# Patient Record
Sex: MALE | Race: WHITE | NOT HISPANIC OR LATINO | Employment: OTHER | URBAN - NONMETROPOLITAN AREA
[De-identification: names, ages, dates, MRNs, and addresses within clinical notes are randomized per-mention and may not be internally consistent; named-entity substitution may affect disease eponyms.]

---

## 2023-02-09 ENCOUNTER — TELEPHONE (OUTPATIENT)
Dept: SURGERY | Facility: CLINIC | Age: 63
End: 2023-02-09
Payer: MEDICARE

## 2023-02-22 NOTE — TELEPHONE ENCOUNTER
CAC GOT RETURNED DUE TO BAD ADDRESS. CALLED AND LVM WITH PT TO VERIFY ADDRESS SO I CAN SEND A NEW ONE.

## 2023-03-22 ENCOUNTER — PREP FOR SURGERY (OUTPATIENT)
Dept: OTHER | Facility: HOSPITAL | Age: 63
End: 2023-03-22
Payer: MEDICARE

## 2023-03-22 DIAGNOSIS — Z12.11 COLON CANCER SCREENING: Primary | ICD-10-CM

## 2023-04-10 ENCOUNTER — ANESTHESIA EVENT (OUTPATIENT)
Dept: PERIOP | Facility: HOSPITAL | Age: 63
End: 2023-04-10
Payer: MEDICARE

## 2023-04-10 RX ORDER — LOSARTAN POTASSIUM AND HYDROCHLOROTHIAZIDE 12.5; 5 MG/1; MG/1
1 TABLET ORAL DAILY
COMMUNITY

## 2023-04-11 ENCOUNTER — HOSPITAL ENCOUNTER (OUTPATIENT)
Facility: HOSPITAL | Age: 63
Setting detail: HOSPITAL OUTPATIENT SURGERY
Discharge: HOME | End: 2023-04-11
Attending: SURGERY | Admitting: SURGERY
Payer: MEDICARE

## 2023-04-11 ENCOUNTER — ANESTHESIA (OUTPATIENT)
Dept: PERIOP | Facility: HOSPITAL | Age: 63
End: 2023-04-11
Payer: MEDICARE

## 2023-04-11 VITALS
BODY MASS INDEX: 31.21 KG/M2 | OXYGEN SATURATION: 95 % | RESPIRATION RATE: 15 BRPM | DIASTOLIC BLOOD PRESSURE: 80 MMHG | WEIGHT: 218 LBS | HEIGHT: 70 IN | SYSTOLIC BLOOD PRESSURE: 158 MMHG | TEMPERATURE: 97.4 F | HEART RATE: 60 BPM

## 2023-04-11 DIAGNOSIS — Z12.11 COLON CANCER SCREENING: ICD-10-CM

## 2023-04-11 PROCEDURE — 45380 COLONOSCOPY AND BIOPSY: CPT | Performed by: SURGERY

## 2023-04-11 PROCEDURE — 88305 TISSUE EXAM BY PATHOLOGIST: CPT | Performed by: SURGERY

## 2023-04-11 PROCEDURE — 25010000002 PROPOFOL 200 MG/20ML EMULSION: Performed by: REGISTERED NURSE

## 2023-04-11 PROCEDURE — 45385 COLONOSCOPY W/LESION REMOVAL: CPT | Performed by: SURGERY

## 2023-04-11 RX ORDER — SODIUM CHLORIDE 0.9 % (FLUSH) 0.9 %
10 SYRINGE (ML) INJECTION EVERY 12 HOURS SCHEDULED
Status: DISCONTINUED | OUTPATIENT
Start: 2023-04-11 | End: 2023-04-11 | Stop reason: HOSPADM

## 2023-04-11 RX ORDER — PROPOFOL 10 MG/ML
INJECTION, EMULSION INTRAVENOUS AS NEEDED
Status: DISCONTINUED | OUTPATIENT
Start: 2023-04-11 | End: 2023-04-11 | Stop reason: SURG

## 2023-04-11 RX ORDER — SODIUM CHLORIDE, SODIUM LACTATE, POTASSIUM CHLORIDE, CALCIUM CHLORIDE 600; 310; 30; 20 MG/100ML; MG/100ML; MG/100ML; MG/100ML
100 INJECTION, SOLUTION INTRAVENOUS CONTINUOUS
Status: DISCONTINUED | OUTPATIENT
Start: 2023-04-11 | End: 2023-04-11 | Stop reason: HOSPADM

## 2023-04-11 RX ORDER — GLYCOPYRROLATE 0.2 MG/ML
INJECTION INTRAMUSCULAR; INTRAVENOUS AS NEEDED
Status: DISCONTINUED | OUTPATIENT
Start: 2023-04-11 | End: 2023-04-11 | Stop reason: SURG

## 2023-04-11 RX ORDER — LIDOCAINE HYDROCHLORIDE 10 MG/ML
INJECTION, SOLUTION EPIDURAL; INFILTRATION; INTRACAUDAL; PERINEURAL AS NEEDED
Status: DISCONTINUED | OUTPATIENT
Start: 2023-04-11 | End: 2023-04-11 | Stop reason: SURG

## 2023-04-11 RX ORDER — SODIUM CHLORIDE, SODIUM LACTATE, POTASSIUM CHLORIDE, CALCIUM CHLORIDE 600; 310; 30; 20 MG/100ML; MG/100ML; MG/100ML; MG/100ML
9 INJECTION, SOLUTION INTRAVENOUS CONTINUOUS
Status: DISCONTINUED | OUTPATIENT
Start: 2023-04-11 | End: 2023-04-11 | Stop reason: HOSPADM

## 2023-04-11 RX ORDER — SODIUM CHLORIDE 0.9 % (FLUSH) 0.9 %
10 SYRINGE (ML) INJECTION AS NEEDED
Status: DISCONTINUED | OUTPATIENT
Start: 2023-04-11 | End: 2023-04-11 | Stop reason: HOSPADM

## 2023-04-11 RX ORDER — LIDOCAINE HYDROCHLORIDE 10 MG/ML
0.5 INJECTION, SOLUTION EPIDURAL; INFILTRATION; INTRACAUDAL; PERINEURAL ONCE AS NEEDED
Status: DISCONTINUED | OUTPATIENT
Start: 2023-04-11 | End: 2023-04-11 | Stop reason: HOSPADM

## 2023-04-11 RX ORDER — ONDANSETRON 2 MG/ML
4 INJECTION INTRAMUSCULAR; INTRAVENOUS ONCE AS NEEDED
Status: DISCONTINUED | OUTPATIENT
Start: 2023-04-11 | End: 2023-04-11 | Stop reason: HOSPADM

## 2023-04-11 RX ORDER — SODIUM CHLORIDE 9 MG/ML
40 INJECTION, SOLUTION INTRAVENOUS AS NEEDED
Status: DISCONTINUED | OUTPATIENT
Start: 2023-04-11 | End: 2023-04-11 | Stop reason: HOSPADM

## 2023-04-11 RX ADMIN — GLYCOPYRROLATE 0.2 MG: 0.2 INJECTION INTRAMUSCULAR; INTRAVENOUS at 08:04

## 2023-04-11 RX ADMIN — PROPOFOL INJECTABLE EMULSION 75 MCG/KG/MIN: 10 INJECTION, EMULSION INTRAVENOUS at 08:02

## 2023-04-11 RX ADMIN — SODIUM CHLORIDE, POTASSIUM CHLORIDE, SODIUM LACTATE AND CALCIUM CHLORIDE 9 ML/HR: 600; 310; 30; 20 INJECTION, SOLUTION INTRAVENOUS at 07:21

## 2023-04-11 RX ADMIN — PROPOFOL INJECTABLE EMULSION 100 MG: 10 INJECTION, EMULSION INTRAVENOUS at 07:59

## 2023-04-11 RX ADMIN — PROPOFOL INJECTABLE EMULSION 50 MG: 10 INJECTION, EMULSION INTRAVENOUS at 08:01

## 2023-04-11 RX ADMIN — LIDOCAINE HYDROCHLORIDE 50 MG: 10 INJECTION, SOLUTION EPIDURAL; INFILTRATION; INTRACAUDAL; PERINEURAL at 07:59

## 2023-04-11 NOTE — ANESTHESIA PREPROCEDURE EVALUATION
Anesthesia Evaluation     Patient summary reviewed and Nursing notes reviewed   no history of anesthetic complications:  NPO Solid Status: > 8 hours  NPO Liquid Status: > 8 hours           Airway   Mallampati: II  TM distance: >3 FB  No difficulty expected  Dental    (+) lower dentures and upper dentures    Pulmonary - normal exam   (+) a smoker (1 ppd smoker x 45 years) Current Smoked day of surgery,   Cardiovascular - normal exam    (+) hypertension well controlled,       Neuro/Psych- negative ROS  GI/Hepatic/Renal/Endo - negative ROS     Musculoskeletal     Abdominal  - normal exam   Substance History   (+) alcohol use (beers on the weekends),      OB/GYN negative ob/gyn ROS         Other   arthritis,                    Anesthesia Plan    ASA 2     MAC     intravenous induction     Anesthetic plan, risks, benefits, and alternatives have been provided, discussed and informed consent has been obtained with: patient.  Pre-procedure education provided  Use of blood products discussed with patient  Consented to blood products.   Plan discussed with CRNA.        CODE STATUS:

## 2023-04-11 NOTE — ANESTHESIA POSTPROCEDURE EVALUATION
Patient: Wesly Huizar    Procedure Summary     Date: 04/11/23 Room / Location: [facility] ENDOSCOPY 1 /  [facility]    Anesthesia Start: 0758 Anesthesia Stop: 0829    Procedure: colonoscopy with possible biopsy or polypectomy Diagnosis:       Colon cancer screening      (Colon cancer screening [Z12.11])    Surgeons: Mary Jurado DO Provider: Wesly Samuels CRNA    Anesthesia Type: MAC ASA Status: 2          Anesthesia Type: MAC    Vitals  Vitals Value Taken Time   /73 04/11/23 0840   Temp     Pulse 65 04/11/23 0840   Resp 12 04/11/23 0840   SpO2 96 % 04/11/23 0840           Post Anesthesia Care and Evaluation    Patient location during evaluation: bedside  Patient participation: complete - patient participated  Level of consciousness: awake and alert  Pain score: 0  Pain management: adequate    Airway patency: patent  Anesthetic complications: No anesthetic complications  PONV Status: none  Cardiovascular status: acceptable  Respiratory status: acceptable  Hydration status: acceptable  No anesthesia care post op

## 2023-04-11 NOTE — H&P
"General Surgery      Patient Care Team:  Charan Orellana MD as PCP - General (Family Medicine)  Lam Garcia MD as PCP - Family Medicine    CHIEF COMPLAINT: History of colon polyps, family history of colon cancer    HISTORY OF PRESENT ILLNESS:    This very pleasant 8-year-old male is here for colonoscopy.  His last colonoscopy was 7 or 8 years ago.  He did have polyps removed on his last colonoscopy.  His brother  of colon cancer.  He has no complaints.      Past Medical History:   Diagnosis Date   • Arthritis    • Hypertension    • Polyp of colon      Past Surgical History:   Procedure Laterality Date   • COLONOSCOPY     • SHOULDER ARTHROSCOPY Right    • SHOULDER ARTHROSCOPY W/ ROTATOR CUFF REPAIR Left     X2     History reviewed. No pertinent family history.  Social History     Tobacco Use   • Smoking status: Every Day     Packs/day: 1.00     Years: 45.00     Pack years: 45.00     Types: Cigarettes   Vaping Use   • Vaping Use: Never used   Substance Use Topics   • Alcohol use: Yes     Comment: weekends   • Drug use: Never     Medications Prior to Admission   Medication Sig Dispense Refill Last Dose   • losartan-hydrochlorothiazide (HYZAAR) 50-12.5 MG per tablet Take 1 tablet by mouth Daily.      • meloxicam (MOBIC) 7.5 MG tablet Take 1 tablet by mouth Daily.        Allergies:  Patient has no active allergies.    REVIEW OF SYSTEMS:  Please see the above history of present illness for pertinent positives and negatives.  The remainder of the patient's systems have been reviewed and are negative.     Vital Signs       Flowsheet Rows    Flowsheet Row First Filed Value   Admission Height 177.8 cm (70\") Documented at 04/10/2023 1015   Admission Weight 99.8 kg (220 lb) Documented at 04/10/2023 1015           Physical Exam:  Physical Exam   Constitutional: Patient appears well-developed and well-nourished and in no acute distress   HEENT:   Head: Normocephalic and atraumatic.   Eyes:  Pupils are equal, round, and " reactive to light.  Mouth and Throat: Patient has moist mucous membranes. Oropharynx is clear of any erythema or exudate.     Musculoskeletal: Normal posture.  Extremities: No edema. Pulses are palpable in all 4 extremities.  Neurological: Patient is alert and oriented.  Psychological:   Mood and behavior appropriate.  Skin: Skin is warm and dry.    Debilities/Disabilities Identified: None    Emotional Behavior: Appropriate           Results Review:    I reviewed the patient's new clinical results.  Lab Results (most recent)     None          Imaging Results (Most Recent)     None        not reviewed    ECG/EMG Results (most recent)     None        not reviewed    Assessment & Plan     Personal history of colon polyp  Family history of colon cancer  I discussed with the patient the benefits and risks of performing endoscopy.  Benefits and risks were not limited to but including bleeding, infection, perforation, complications of anesthesia, aspiration.  The patient appeared to understand and is willing to proceed.    Thank you for allowing me to participate in the care of this interesting patient.      Mary Jurado DO  04/11/23  07:11 EDT

## 2023-04-11 NOTE — OP NOTE
Colonoscopy Procedure Note      Pre-operative Diagnosis: History of colon polyps    Post-operative Diagnosis: Polyps of the right colon, transverse colon, and rectum    Procedure: Colonoscopy with polypectomy using hot snare and cold forceps    Surgeon: Adrien    Anesthetic: Wesly Cm CRNA    Estimated Blood Loss: Minimal    Complications: None    Indications: History of colon polyps    Findings/Treatments:   Right colon polyp x1-removed with hot snare  Transverse colon polyps x5-removed with cold forceps  Rectal polyps x5-removed with cold forceps       Scope Withdrawal Time:  > 6 minutes      Recommendations: Await pathology      Procedure Details     After discussing the benefits and risks of the procedure with the patient, not limited to but including:  Bleeding, infection, perforation, aspiration; informed consent was signed.  The patient was taken into the endoscopy room at [facility] and placed in the left lateral decubitus position.  MAC anesthesia was given with appropriate cardiopulmonary monitoring.  A rectal exam was performed.  Sphincter tone was normal.  The colonoscope was then inserted and carefully advanced to the cecum while visualizing the mucosa.  The cecum was identified by the ileocecal valve and the orifice of the appendix.  Once in the cecum the scope was withdrawn into the right colon where a large polyp was removed hot snare.  The scope was then slowly withdrawn while carefully evaluating the mucosa and deflating air.  There were 5 transverse colon polyps removed with cold forceps as well as 5 rectal polyps removed with cold forceps.  After the rectal polyps were removed the scope was retroflexed and straightened and withdrawn.  Wesly was then taken to the recovery area in stable postoperative condition having tolerated his procedure well.    Mary Jurado DO

## 2023-04-12 LAB
LAB AP CASE REPORT: NORMAL
PATH REPORT.FINAL DX SPEC: NORMAL
PATH REPORT.GROSS SPEC: NORMAL

## 2024-06-07 ENCOUNTER — TRANSCRIBE ORDERS (OUTPATIENT)
Dept: ADMINISTRATIVE | Facility: HOSPITAL | Age: 64
End: 2024-06-07
Payer: MEDICARE

## 2024-06-07 ENCOUNTER — HOSPITAL ENCOUNTER (OUTPATIENT)
Dept: GENERAL RADIOLOGY | Facility: HOSPITAL | Age: 64
Discharge: HOME | End: 2024-06-07
Payer: MEDICARE

## 2024-06-07 DIAGNOSIS — M25.511 RIGHT SHOULDER PAIN, UNSPECIFIED CHRONICITY: Primary | ICD-10-CM

## 2024-06-07 DIAGNOSIS — M25.511 RIGHT SHOULDER PAIN, UNSPECIFIED CHRONICITY: ICD-10-CM

## 2024-06-07 PROCEDURE — 72110 X-RAY EXAM L-2 SPINE 4/>VWS: CPT

## 2024-07-02 ENCOUNTER — TRANSCRIBE ORDERS (OUTPATIENT)
Dept: ADMINISTRATIVE | Facility: HOSPITAL | Age: 64
End: 2024-07-02
Payer: MEDICARE

## 2024-07-02 ENCOUNTER — HOSPITAL ENCOUNTER (OUTPATIENT)
Dept: GENERAL RADIOLOGY | Facility: HOSPITAL | Age: 64
Discharge: HOME | End: 2024-07-02
Admitting: ANESTHESIOLOGY
Payer: MEDICARE

## 2024-07-02 DIAGNOSIS — M25.511 RIGHT SHOULDER PAIN, UNSPECIFIED CHRONICITY: Primary | ICD-10-CM

## 2024-07-02 DIAGNOSIS — M25.511 RIGHT SHOULDER PAIN, UNSPECIFIED CHRONICITY: ICD-10-CM

## 2024-07-02 PROCEDURE — 73502 X-RAY EXAM HIP UNI 2-3 VIEWS: CPT

## 2024-07-11 ENCOUNTER — TRANSCRIBE ORDERS (OUTPATIENT)
Dept: MRI IMAGING | Facility: HOSPITAL | Age: 64
End: 2024-07-11
Payer: MEDICARE

## 2024-07-17 ENCOUNTER — TRANSCRIBE ORDERS (OUTPATIENT)
Dept: MRI IMAGING | Facility: HOSPITAL | Age: 64
End: 2024-07-17
Payer: MEDICARE

## 2024-07-17 DIAGNOSIS — M54.50 LOW BACK PAIN, UNSPECIFIED BACK PAIN LATERALITY, UNSPECIFIED CHRONICITY, UNSPECIFIED WHETHER SCIATICA PRESENT: Primary | ICD-10-CM

## 2024-07-29 ENCOUNTER — HOSPITAL ENCOUNTER (OUTPATIENT)
Dept: MRI IMAGING | Facility: HOSPITAL | Age: 64
Discharge: HOME | End: 2024-07-29
Admitting: ANESTHESIOLOGY
Payer: MEDICARE

## 2024-07-29 DIAGNOSIS — M54.50 LOW BACK PAIN, UNSPECIFIED BACK PAIN LATERALITY, UNSPECIFIED CHRONICITY, UNSPECIFIED WHETHER SCIATICA PRESENT: ICD-10-CM

## 2024-07-29 PROCEDURE — 72148 MRI LUMBAR SPINE W/O DYE: CPT

## 2024-10-08 ENCOUNTER — OFFICE VISIT (OUTPATIENT)
Dept: INTERNAL MEDICINE | Facility: CLINIC | Age: 64
End: 2024-10-08
Payer: MEDICARE

## 2024-10-08 VITALS
DIASTOLIC BLOOD PRESSURE: 82 MMHG | HEIGHT: 70 IN | HEART RATE: 82 BPM | BODY MASS INDEX: 29.2 KG/M2 | OXYGEN SATURATION: 96 % | WEIGHT: 204 LBS | TEMPERATURE: 98.4 F | SYSTOLIC BLOOD PRESSURE: 132 MMHG

## 2024-10-08 DIAGNOSIS — Z12.5 SCREENING FOR PROSTATE CANCER: ICD-10-CM

## 2024-10-08 DIAGNOSIS — M79.605 LEFT LEG PAIN: ICD-10-CM

## 2024-10-08 DIAGNOSIS — M25.511 CHRONIC RIGHT SHOULDER PAIN: Primary | ICD-10-CM

## 2024-10-08 DIAGNOSIS — R73.9 HYPERGLYCEMIA: ICD-10-CM

## 2024-10-08 DIAGNOSIS — G89.29 CHRONIC RIGHT SHOULDER PAIN: Primary | ICD-10-CM

## 2024-10-08 DIAGNOSIS — Z80.0 FAMILY HISTORY OF STOMACH CANCER: ICD-10-CM

## 2024-10-08 DIAGNOSIS — R73.09 OTHER ABNORMAL GLUCOSE: ICD-10-CM

## 2024-10-08 DIAGNOSIS — L57.0 ACTINIC KERATOSIS: ICD-10-CM

## 2024-10-08 DIAGNOSIS — I10 PRIMARY HYPERTENSION: ICD-10-CM

## 2024-10-08 DIAGNOSIS — R79.9 ABNORMAL FINDING OF BLOOD CHEMISTRY, UNSPECIFIED: ICD-10-CM

## 2024-10-08 DIAGNOSIS — Z13.220 SCREENING FOR HYPERLIPIDEMIA: ICD-10-CM

## 2024-10-08 LAB
ALBUMIN SERPL-MCNC: 4.3 G/DL (ref 3.5–5.2)
ALBUMIN/GLOB SERPL: 1.9 G/DL
ALP SERPL-CCNC: 88 U/L (ref 39–117)
ALT SERPL-CCNC: 18 U/L (ref 1–41)
AST SERPL-CCNC: 16 U/L (ref 1–40)
BASOPHILS # BLD AUTO: 0.08 10*3/MM3 (ref 0–0.2)
BASOPHILS NFR BLD AUTO: 0.8 % (ref 0–1.5)
BILIRUB SERPL-MCNC: 0.5 MG/DL (ref 0–1.2)
BUN SERPL-MCNC: 12 MG/DL (ref 8–23)
BUN/CREAT SERPL: 14.3 (ref 7–25)
CALCIUM SERPL-MCNC: 9.8 MG/DL (ref 8.6–10.5)
CHLORIDE SERPL-SCNC: 102 MMOL/L (ref 98–107)
CHOLEST SERPL-MCNC: 208 MG/DL (ref 0–200)
CO2 SERPL-SCNC: 23.4 MMOL/L (ref 22–29)
CREAT SERPL-MCNC: 0.84 MG/DL (ref 0.76–1.27)
EGFRCR SERPLBLD CKD-EPI 2021: 99.2 ML/MIN/1.73
EOSINOPHIL # BLD AUTO: 0.14 10*3/MM3 (ref 0–0.4)
EOSINOPHIL NFR BLD AUTO: 1.4 % (ref 0.3–6.2)
ERYTHROCYTE [DISTWIDTH] IN BLOOD BY AUTOMATED COUNT: 13.1 % (ref 12.3–15.4)
GLOBULIN SER CALC-MCNC: 2.3 GM/DL
GLUCOSE SERPL-MCNC: 93 MG/DL (ref 65–99)
HBA1C MFR BLD: 6.4 % (ref 4.8–5.6)
HCT VFR BLD AUTO: 51.3 % (ref 37.5–51)
HDLC SERPL-MCNC: 52 MG/DL (ref 40–60)
HGB BLD-MCNC: 16.4 G/DL (ref 13–17.7)
IMM GRANULOCYTES # BLD AUTO: 0.06 10*3/MM3 (ref 0–0.05)
IMM GRANULOCYTES NFR BLD AUTO: 0.6 % (ref 0–0.5)
LDLC SERPL CALC-MCNC: 131 MG/DL (ref 0–100)
LDLC/HDLC SERPL: 2.47 {RATIO}
LYMPHOCYTES # BLD AUTO: 2.82 10*3/MM3 (ref 0.7–3.1)
LYMPHOCYTES NFR BLD AUTO: 28.9 % (ref 19.6–45.3)
MCH RBC QN AUTO: 28.4 PG (ref 26.6–33)
MCHC RBC AUTO-ENTMCNC: 32 G/DL (ref 31.5–35.7)
MCV RBC AUTO: 88.9 FL (ref 79–97)
MONOCYTES # BLD AUTO: 0.96 10*3/MM3 (ref 0.1–0.9)
MONOCYTES NFR BLD AUTO: 9.8 % (ref 5–12)
NEUTROPHILS # BLD AUTO: 5.7 10*3/MM3 (ref 1.7–7)
NEUTROPHILS NFR BLD AUTO: 58.5 % (ref 42.7–76)
NRBC BLD AUTO-RTO: 0 /100 WBC (ref 0–0.2)
PLATELET # BLD AUTO: 324 10*3/MM3 (ref 140–450)
POTASSIUM SERPL-SCNC: 4.7 MMOL/L (ref 3.5–5.2)
PROT SERPL-MCNC: 6.6 G/DL (ref 6–8.5)
PSA SERPL-MCNC: 0.56 NG/ML (ref 0–4)
RBC # BLD AUTO: 5.77 10*6/MM3 (ref 4.14–5.8)
SODIUM SERPL-SCNC: 139 MMOL/L (ref 136–145)
T4 FREE SERPL-MCNC: 1.35 NG/DL (ref 0.92–1.68)
TRIGL SERPL-MCNC: 138 MG/DL (ref 0–150)
TSH SERPL DL<=0.005 MIU/L-ACNC: 1.15 UIU/ML (ref 0.27–4.2)
VLDLC SERPL CALC-MCNC: 25 MG/DL (ref 5–40)
WBC # BLD AUTO: 9.76 10*3/MM3 (ref 3.4–10.8)

## 2024-10-08 PROCEDURE — 1160F RVW MEDS BY RX/DR IN RCRD: CPT | Performed by: INTERNAL MEDICINE

## 2024-10-08 PROCEDURE — 1159F MED LIST DOCD IN RCRD: CPT | Performed by: INTERNAL MEDICINE

## 2024-10-08 PROCEDURE — 99204 OFFICE O/P NEW MOD 45 MIN: CPT | Performed by: INTERNAL MEDICINE

## 2024-10-08 RX ORDER — DICLOFENAC POTASSIUM 50 MG/1
1 TABLET, FILM COATED ORAL 2 TIMES DAILY
COMMUNITY
End: 2024-10-08

## 2024-10-08 RX ORDER — LOVASTATIN 10 MG
1 TABLET ORAL DAILY
COMMUNITY
End: 2024-10-08

## 2024-10-08 RX ORDER — METHOCARBAMOL 750 MG/1
1 TABLET, FILM COATED ORAL 3 TIMES DAILY
COMMUNITY
Start: 2024-07-24 | End: 2024-10-08

## 2024-10-08 RX ORDER — ACETAMINOPHEN 325 MG/1
650 TABLET ORAL
COMMUNITY
End: 2024-10-08

## 2024-10-08 NOTE — PROGRESS NOTES
"Chief Complaint  Establish Care and Shoulder Pain (Has been getting injections for shoulder and back pain. Would like to discuss referral for pain management. Has been seeing Drarin (in chart) but would like to switch locations. )    Subjective        Wesly Huizar presents to [facility]  History of Present Illness    Mr. Huizar is a tavon 62 yo M who presents to establish care and discuss multiple areas of pain.  Worst pain is in his right shoulder.  He feels like there is a knife stuck in his right shoulder.  Has had success with injections of his right shoulder, but they still were only lasting 2-3 weeks.  Then had ablation of right shoulder nerves.  Then had fall onto right shoulder and had trouble getting back into pain clinic.     Objective   Vital Signs:  /82 (BP Location: Left arm, Patient Position: Sitting, Cuff Size: Adult)   Pulse 82   Temp 98.4 °F (36.9 °C) (Infrared)   Ht 177.8 cm (70\")   Wt 92.5 kg (204 lb)   SpO2 96%   BMI 29.27 kg/m²   Estimated body mass index is 29.27 kg/m² as calculated from the following:    Height as of this encounter: 177.8 cm (70\").    Weight as of this encounter: 92.5 kg (204 lb).    BMI is >= 25 and <30. (Overweight) The following options were offered after discussion;: exercise counseling/recommendations and nutrition counseling/recommendations      Physical Exam  Vitals reviewed.   Constitutional:       General: He is not in acute distress.     Appearance: Normal appearance.   HENT:      Head: Normocephalic and atraumatic.      Nose: Nose normal.      Mouth/Throat:      Mouth: Mucous membranes are moist.   Eyes:      Conjunctiva/sclera: Conjunctivae normal.   Cardiovascular:      Rate and Rhythm: Normal rate and regular rhythm.      Pulses: Normal pulses.      Heart sounds: Normal heart sounds.   Pulmonary:      Effort: Pulmonary effort is normal.      Breath sounds: Normal breath sounds.   Abdominal:      Palpations: " Abdomen is soft.      Tenderness: There is no abdominal tenderness.   Musculoskeletal:      Right lower leg: No edema.      Left lower leg: No edema.   Skin:     General: Skin is warm and dry.   Neurological:      General: No focal deficit present.      Mental Status: He is alert.   Psychiatric:         Mood and Affect: Mood normal.        Result Review :  The following data was reviewed by: Edith Burns MD on 10/08/2024:  Common labs          10/8/2024    11:35   Common Labs   Glucose 93    BUN 12    Creatinine 0.84    Sodium 139    Potassium 4.7    Chloride 102    Calcium 9.8    Total Protein 6.6    Albumin 4.3    Total Bilirubin 0.5    Alkaline Phosphatase 88    AST (SGOT) 16    ALT (SGPT) 18    WBC 9.76    Hemoglobin 16.4    Hematocrit 51.3    Platelets 324    Total Cholesterol 208    Triglycerides 138    HDL Cholesterol 52    LDL Cholesterol  131    Hemoglobin A1C 6.40    PSA 0.559      Data reviewed : reviewed imaging from summer 2024 in the chart           Assessment and Plan   Diagnoses and all orders for this visit:    1. Chronic right shoulder pain (Primary)  -     Ambulatory Referral to Pain Management    2. Left leg pain  -     Ambulatory Referral to Pain Management    3. Primary hypertension  -     Comprehensive Metabolic Panel  -     T4, Free  -     TSH    4. Screening for prostate cancer  -     PSA Screen    5. Hyperglycemia  -     Comprehensive Metabolic Panel  -     Lipid Panel With LDL / HDL Ratio  -     Hemoglobin A1c    6. Screening for hyperlipidemia  -     Lipid Panel With LDL / HDL Ratio    7. Family history of stomach cancer  -     CBC & Differential    8. Abnormal finding of blood chemistry, unspecified  -     Lipid Panel With LDL / HDL Ratio    9. Other abnormal glucose  -     CBC & Differential    10. Actinic keratosis  -     Ambulatory Referral to Dermatology      Refer for possible injections like he had received/had success with previously.      Refer to derm for AK on face, but if  cannot get in, could do shave biopsy in office at next visit.     Lab evaluation as per above.          Follow Up   Return in about 3 months (around 1/8/2025) for f/u chronic medical conditions.  Patient was given instructions and counseling regarding his condition or for health maintenance advice. Please see specific information pulled into the AVS if appropriate.

## 2024-10-09 ENCOUNTER — PATIENT ROUNDING (BHMG ONLY) (OUTPATIENT)
Dept: INTERNAL MEDICINE | Facility: CLINIC | Age: 64
End: 2024-10-09
Payer: MEDICARE

## 2024-10-09 NOTE — PROGRESS NOTES
My name is Amber Angel and I am the Referral clerk at [facility].     I would like  to officially welcome you to our practice and ask about your recent visit.     Tell me about your visit with us. What things went well?        We're always looking for ways to make our patients' experiences even better. Do you have recommendations on ways we may improve?      Overall were you satisfied with your first visit to our practice?        I appreciate you taking the time to answer these questions. Is there anything else I can do for you?       Thank you, and have a great day.     Amber

## 2025-04-02 ENCOUNTER — HOSPITAL ENCOUNTER (OUTPATIENT)
Dept: GENERAL RADIOLOGY | Facility: HOSPITAL | Age: 65
Discharge: HOME | End: 2025-04-02
Payer: MEDICARE

## 2025-04-02 ENCOUNTER — TRANSCRIBE ORDERS (OUTPATIENT)
Dept: ADMINISTRATIVE | Facility: HOSPITAL | Age: 65
End: 2025-04-02
Payer: MEDICARE

## 2025-04-02 DIAGNOSIS — M25.511 RIGHT SHOULDER PAIN, UNSPECIFIED CHRONICITY: ICD-10-CM

## 2025-04-02 DIAGNOSIS — M25.511 RIGHT SHOULDER PAIN, UNSPECIFIED CHRONICITY: Primary | ICD-10-CM

## 2025-04-02 DIAGNOSIS — M25.512 LEFT SHOULDER PAIN, UNSPECIFIED CHRONICITY: ICD-10-CM

## 2025-04-02 DIAGNOSIS — M54.40 LOW BACK PAIN WITH SCIATICA, SCIATICA LATERALITY UNSPECIFIED, UNSPECIFIED BACK PAIN LATERALITY, UNSPECIFIED CHRONICITY: ICD-10-CM

## 2025-04-02 PROCEDURE — 73030 X-RAY EXAM OF SHOULDER: CPT

## 2025-04-02 PROCEDURE — 72100 X-RAY EXAM L-S SPINE 2/3 VWS: CPT

## 2025-04-17 ENCOUNTER — HOSPITAL ENCOUNTER (EMERGENCY)
Facility: HOSPITAL | Age: 65
Discharge: HOME | End: 2025-04-18
Attending: EMERGENCY MEDICINE
Payer: MEDICARE

## 2025-04-17 ENCOUNTER — HOSPITAL ENCOUNTER (EMERGENCY)
Facility: HOSPITAL | Age: 65
Discharge: HOME | End: 2025-04-17
Attending: STUDENT IN AN ORGANIZED HEALTH CARE EDUCATION/TRAINING PROGRAM
Payer: MEDICARE

## 2025-04-17 VITALS
HEART RATE: 90 BPM | DIASTOLIC BLOOD PRESSURE: 92 MMHG | TEMPERATURE: 97.7 F | OXYGEN SATURATION: 99 % | HEIGHT: 70 IN | WEIGHT: 204 LBS | SYSTOLIC BLOOD PRESSURE: 173 MMHG | RESPIRATION RATE: 18 BRPM | BODY MASS INDEX: 29.2 KG/M2

## 2025-04-17 VITALS
OXYGEN SATURATION: 96 % | HEIGHT: 70 IN | HEART RATE: 75 BPM | RESPIRATION RATE: 18 BRPM | WEIGHT: 210 LBS | DIASTOLIC BLOOD PRESSURE: 86 MMHG | BODY MASS INDEX: 30.06 KG/M2 | SYSTOLIC BLOOD PRESSURE: 185 MMHG | TEMPERATURE: 99.3 F

## 2025-04-17 DIAGNOSIS — H60.391 OTHER INFECTIVE ACUTE OTITIS EXTERNA OF RIGHT EAR: Primary | ICD-10-CM

## 2025-04-17 DIAGNOSIS — H60.331 ACUTE SWIMMER'S EAR OF RIGHT SIDE: Primary | ICD-10-CM

## 2025-04-17 DIAGNOSIS — H92.01 RIGHT EAR PAIN: ICD-10-CM

## 2025-04-17 PROCEDURE — 99283 EMERGENCY DEPT VISIT LOW MDM: CPT | Performed by: EMERGENCY MEDICINE

## 2025-04-17 PROCEDURE — 99283 EMERGENCY DEPT VISIT LOW MDM: CPT | Performed by: STUDENT IN AN ORGANIZED HEALTH CARE EDUCATION/TRAINING PROGRAM

## 2025-04-17 RX ORDER — CIPROFLOXACIN AND DEXAMETHASONE 3; 1 MG/ML; MG/ML
4 SUSPENSION/ DROPS AURICULAR (OTIC) 2 TIMES DAILY
Status: DISCONTINUED | OUTPATIENT
Start: 2025-04-17 | End: 2025-04-17 | Stop reason: HOSPADM

## 2025-04-17 RX ORDER — PREDNISONE 20 MG/1
60 TABLET ORAL ONCE
Status: COMPLETED | OUTPATIENT
Start: 2025-04-17 | End: 2025-04-18

## 2025-04-17 RX ORDER — CIPROFLOXACIN AND DEXAMETHASONE 3; 1 MG/ML; MG/ML
4 SUSPENSION/ DROPS AURICULAR (OTIC) 2 TIMES DAILY
Qty: 7.5 ML | Refills: 0 | Status: SHIPPED | OUTPATIENT
Start: 2025-04-17 | End: 2025-04-27

## 2025-04-17 RX ORDER — OFLOXACIN 3 MG/ML
10 SOLUTION AURICULAR (OTIC) DAILY
Qty: 5 ML | Refills: 0 | Status: SHIPPED | OUTPATIENT
Start: 2025-04-17 | End: 2025-04-24

## 2025-04-17 RX ORDER — NAPROXEN 500 MG/1
500 TABLET ORAL EVERY 12 HOURS PRN
Qty: 20 TABLET | Refills: 0 | Status: SHIPPED | OUTPATIENT
Start: 2025-04-17

## 2025-04-17 RX ADMIN — CIPROFLOXACIN AND DEXAMETHASONE 4 DROP: 3; 1 SUSPENSION/ DROPS AURICULAR (OTIC) at 05:13

## 2025-04-17 NOTE — ED PROVIDER NOTES
Subjective   History of Present Illness  Very pleasant 64-year-old male presents to the emergency department with complaints of right ear pain.  He states that he has had some mild drainage that is more clear than anything.  The patient states that he does not have diabetes, but he does wash his ears with shampoo daily.  He denies headache, neck stiffness, facial tenderness, cough, rhinorrhea or sore throat      Review of Systems   All other systems reviewed and are negative.      Past Medical History:   Diagnosis Date    Arthritis     Hypertension     Polyp of colon        Allergies   Allergen Reactions    Hydrocodone Unknown - High Severity and Hives       Past Surgical History:   Procedure Laterality Date    COLONOSCOPY      COLONOSCOPY N/A 4/11/2023    Procedure: colonoscopy with possible biopsy or polypectomy;  Surgeon: Mary Jurado DO;  Location: [facility] OR;  Service: Gastroenterology;  Laterality: N/A;  Ascending polyp x 1- hot snare; Transverse polyp x 5; Rectal polyp x 5    SHOULDER ARTHROSCOPY Right     SHOULDER ARTHROSCOPY W/ ROTATOR CUFF REPAIR Left     X2       No family history on file.    Social History     Socioeconomic History    Marital status:    Tobacco Use    Smoking status: Every Day     Current packs/day: 1.00     Average packs/day: 1 pack/day for 45.0 years (45.0 ttl pk-yrs)     Types: Cigarettes    Smokeless tobacco: Never   Vaping Use    Vaping status: Never Used   Substance and Sexual Activity    Alcohol use: Yes     Comment: weekends    Drug use: Never    Sexual activity: Defer           Objective   Physical Exam  Vitals and nursing note reviewed.   Constitutional:       Appearance: Normal appearance. He is normal weight. He is not ill-appearing, toxic-appearing or diaphoretic.   HENT:      Head: Normocephalic and atraumatic.      Left Ear: External ear normal.      Ears:      Comments: Obvious evidence of right otitis externa with tenderness on palpation to the area, no  mastoid abnormalities, no purulence.  No evidence of malignant otitis externa.  Decent swelling noted.     Nose: Nose normal. No congestion or rhinorrhea.      Mouth/Throat:      Pharynx: No oropharyngeal exudate or posterior oropharyngeal erythema.   Eyes:      Extraocular Movements: Extraocular movements intact.      Conjunctiva/sclera: Conjunctivae normal.      Pupils: Pupils are equal, round, and reactive to light.   Cardiovascular:      Rate and Rhythm: Normal rate and regular rhythm.      Pulses: Normal pulses.   Pulmonary:      Effort: Pulmonary effort is normal.      Breath sounds: Normal breath sounds. No stridor. No wheezing, rhonchi or rales.   Chest:      Chest wall: No tenderness.   Abdominal:      General: There is no distension.      Palpations: Abdomen is soft. There is no mass.   Musculoskeletal:         General: No swelling, tenderness or signs of injury. Normal range of motion.      Cervical back: Normal range of motion. No rigidity or tenderness.   Skin:     General: Skin is warm.      Capillary Refill: Capillary refill takes less than 2 seconds.      Coloration: Skin is not jaundiced or pale.      Findings: No bruising.   Neurological:      General: No focal deficit present.      Mental Status: He is alert and oriented to person, place, and time. Mental status is at baseline.      Motor: No weakness.         Procedures           ED Course                                                       Medical Decision Making    MDM:    Escalation of care including admission/observation considered    - Discussions of management with other providers:  None    - Discussed/reviewed with Radiology regarding test interpretation    - Independent interpretation: None    - Additional patient history obtained from: [facility]    - Review of external non-ED record (if available):  Prior Inpt record, Office record, Outpt record, Prior Outpt labs, PCP record, Outside ED record, Other    - Chronic conditions affecting  care: See HPI and medical Hx.    - Social Determinants of health significantly affecting care:  None        Medical Decision Making Discussion:    This is a 64-year-old who presents with otitis externa to the right ear.  An ear wick was placed due to the fair amount of swelling that was noted For antibiotic delivery.  The patient does not swim or bathe fairly extensively however, he does use shampoo to clean his ears on a regular basis it is possible that the moisture can be trapped into the ear canal and thus bacteria bleeding would occur.  The patient will be given Ciprodex, he is instructed to follow-up with his PCP to ensure improvement over the next week.  The patient otherwise is well-appearing, nontoxic and has no evidence of mastoid abnormality or malignant otitis externa.  Patient is stable at this time for discharge with outpatient follow-up.    The patient has been given very strict return precautions to return to the emergency department should there be any acute change or worsening of their condition.  I have explained my findings and the patient has expressed understanding to me.  I explained that the work-up performed in the ED has been based on the specific complaint and concern, as the nature of emergency medicine is complaint driven and they understand that new symptoms may arise.  I have told them that, should there be any new symptoms, worsening or changing symptoms, a new work-up may be indicated that they are encouraged to return to the emergency department or promptly contact their primary care physician. We have employed a shared decision-making process as the discussion of their disposition.  The patient has been educated as to the nature of the visit, the tests and work-up performed and the findings from today's visit. At this time, there does not appear to be any acute emergent process that necessitates admission to the hospital, however, the patient understands that this can change  unexpectedly. At this time, the patient is stable for discharge home and agrees to follow-up with her primary care physician in the next 24 to 48 hours or earlier should they be able to obtain an appointment.    The patient was counseled regarding diagnostic results and treatment plan and patient has indicated understanding of these instructions.      Problems Addressed:  Other infective acute otitis externa of right ear: complicated acute illness or injury    Risk  Prescription drug management.        Final diagnoses:   Other infective acute otitis externa of right ear       ED Disposition  ED Disposition       ED Disposition   Discharge    Condition   Good    Comment   --               Edith Burns MD  [address]  [address]  [place] [address] [phone]               Medication List        New Prescriptions      ciprofloxacin-dexAMETHasone 0.3-0.1 % otic suspension  Commonly known as: CIPRODEX  Administer 4 drops to the right ear 2 (Two) Times a Day for 10 days.               Where to Get Your Medications        These medications were sent to [id number] - [place] - [address] - [phone]  - [phone] FX  [address], [place] [address]      Phone: [phone]   ciprofloxacin-dexAMETHasone 0.3-0.1 % otic suspension            Khai Sosa,   04/17/25 8704

## 2025-04-18 PROCEDURE — 63710000001 PREDNISONE PER 1 MG: Performed by: EMERGENCY MEDICINE

## 2025-04-18 RX ADMIN — PREDNISONE 60 MG: 20 TABLET ORAL at 00:05

## 2025-04-18 NOTE — ED PROVIDER NOTES
Subjective   History of Present Illness  Patient presents complaining of ongoing right ear pain.  Patient seen this morning and prescribed medication but when he went to pick it up it was too expensive.  Patient said he had some leftover eardrops and has just been using that today.  Patient came in tonight because he is still having pain.  No therapy taken prior to arrival.  Patient denies any fever, vision changes, or blood coming out of his ear.      Review of Systems   All other systems reviewed and are negative.      Past Medical History:   Diagnosis Date    Arthritis     Hypertension     Polyp of colon        Allergies   Allergen Reactions    Hydrocodone Unknown - High Severity and Hives       Past Surgical History:   Procedure Laterality Date    COLONOSCOPY      COLONOSCOPY N/A 4/11/2023    Procedure: colonoscopy with possible biopsy or polypectomy;  Surgeon: Mary Jurado DO;  Location: [facility] OR;  Service: Gastroenterology;  Laterality: N/A;  Ascending polyp x 1- hot snare; Transverse polyp x 5; Rectal polyp x 5    SHOULDER ARTHROSCOPY Right     SHOULDER ARTHROSCOPY W/ ROTATOR CUFF REPAIR Left     X2       History reviewed. No pertinent family history.    Social History     Socioeconomic History    Marital status:    Tobacco Use    Smoking status: Every Day     Current packs/day: 1.00     Average packs/day: 1 pack/day for 45.0 years (45.0 ttl pk-yrs)     Types: Cigarettes    Smokeless tobacco: Never   Vaping Use    Vaping status: Never Used   Substance and Sexual Activity    Alcohol use: Yes     Comment: weekends    Drug use: Never    Sexual activity: Defer           Objective   Physical Exam  Vitals and nursing note reviewed.   HENT:      Head: Normocephalic.      Right Ear: Drainage, swelling and tenderness present. No mastoid tenderness. Tympanic membrane is not injected.      Left Ear: Tympanic membrane, ear canal and external ear normal.      Ears:      Comments: Minimal thin creamy white  drainage.     Nose: Nose normal.      Mouth/Throat:      Pharynx: Oropharynx is clear.   Eyes:      Conjunctiva/sclera: Conjunctivae normal.   Pulmonary:      Effort: Pulmonary effort is normal.   Musculoskeletal:      Cervical back: Neck supple.   Lymphadenopathy:      Cervical: No cervical adenopathy.   Skin:     General: Skin is warm and dry.      Capillary Refill: Capillary refill takes 2 to 3 seconds.   Neurological:      Mental Status: He is alert and oriented to person, place, and time.   Psychiatric:         Mood and Affect: Mood normal.         Behavior: Behavior normal.         Procedures           ED Course                                                       Medical Decision Making  Ddx otitis externa, otitis media, mastoiditis    2325 patient still remains unremarkable and physical exam and consistent with a otitis externa on the right side.  Patient's antibiotic has been changed to once its about $24.  Patient advised to return for persistent fever, worsening pain, or inability to insert drops.  Patient given ENT follow-up.  All questions personally answered at the bedside and all d/c instructions personally reviewed with pt.  Discussed the importance of close outpt. f/u and pt. understands this and agrees to do so.  Pt agrees to return to ED immediately for any new, persistent, or worsening symptoms.  Spouse was present for entire evaluation and discharge instructions.    EMR Dragon/Transcription disclaimer:  Much of this encounter note is an electronic transcription/translation of spoken language to printed text using the Dragon Dictation System       Problems Addressed:  Acute swimmer's ear of right side: complicated acute illness or injury  Right ear pain: complicated acute illness or injury    Risk  Prescription drug management.        Final diagnoses:   Acute swimmer's ear of right side   Right ear pain       ED Disposition  ED Disposition       ED Disposition   Discharge    Condition   Stable     Comment   --               Edith Burns MD  [address]  [address]  [place] [address] [phone]    In 3 days  If symptoms worsen      [facility]  [address], [address], [place] [id number] [phone]  Call in 1 day           Medication List        New Prescriptions      naproxen 500 MG tablet  Commonly known as: NAPROSYN  Take 1 tablet by mouth Every 12 (Twelve) Hours As Needed for Moderate Pain.     ofloxacin 0.3 % otic solution  Commonly known as: FLOXIN  Administer 10 drops to the right ear Daily for 7 days.               Where to Get Your Medications        These medications were sent to [id number] - [place] - [address] - [phone]  - [phone] FX  [address], [place] [address]      Phone: [phone]   naproxen 500 MG tablet  ofloxacin 0.3 % otic solution            Ho Low MD  04/18/25 3815

## (undated) DEVICE — SAFELINER SUCTION CANISTER 1000CC: Brand: DEROYAL

## (undated) DEVICE — VIAL FORMALIN CAP 10P 40ML

## (undated) DEVICE — SNAR POLYP CAPTIFLX MICRO OVL 13MM 240CM

## (undated) DEVICE — PATIENT RETURN ELECTRODE, SINGLE-USE, CONTACT QUALITY MONITORING, ADULT, WITH 9FT CORD, FOR PATIENTS WEIGING OVER 33LBS. (15KG): Brand: MEGADYNE

## (undated) DEVICE — FRCP BX RADJAW4 NDL 2.8 240CM LG OG BX40

## (undated) DEVICE — Device

## (undated) DEVICE — THE SINGLE USE ETRAP – POLYP TRAP IS USED FOR SUCTION RETRIEVAL OF ENDOSCOPICALLY REMOVED POLYPS.: Brand: ETRAP

## (undated) DEVICE — ADAPT CLN BIOGUARD AIR/H2O DISP

## (undated) DEVICE — BW-412T DISP COMBO CLEANING BRUSH: Brand: SINGLE USE COMBINATION CLEANING BRUSH

## (undated) DEVICE — KT ORCA ORCAPOD DISP STRL